# Patient Record
Sex: FEMALE | Race: BLACK OR AFRICAN AMERICAN | NOT HISPANIC OR LATINO
[De-identification: names, ages, dates, MRNs, and addresses within clinical notes are randomized per-mention and may not be internally consistent; named-entity substitution may affect disease eponyms.]

---

## 2022-05-31 ENCOUNTER — APPOINTMENT (OUTPATIENT)
Dept: ORTHOPEDIC SURGERY | Facility: CLINIC | Age: 72
End: 2022-05-31
Payer: MEDICARE

## 2022-05-31 VITALS — HEIGHT: 69 IN | WEIGHT: 190 LBS | BODY MASS INDEX: 28.14 KG/M2

## 2022-05-31 DIAGNOSIS — I10 ESSENTIAL (PRIMARY) HYPERTENSION: ICD-10-CM

## 2022-05-31 PROBLEM — Z00.00 ENCOUNTER FOR PREVENTIVE HEALTH EXAMINATION: Status: ACTIVE | Noted: 2022-05-31

## 2022-05-31 PROCEDURE — 20610 DRAIN/INJ JOINT/BURSA W/O US: CPT | Mod: 50

## 2022-05-31 PROCEDURE — 99214 OFFICE O/P EST MOD 30 MIN: CPT | Mod: 25

## 2022-05-31 PROCEDURE — J3490M: CUSTOM

## 2022-05-31 NOTE — IMAGING
[de-identified] : Right Knee: Inspection of the knee is as follows: no effusion, erythema, ecchymosis, scars or deformities. Palpation\par of the knee is as follows: medial facet of patella tenderness, lateral facet of patella tenderness, patellar\par compression tenderness and crepitus about the patella. Knee Range of Motion is as follows: Flexion:\par 130 degrees. Range of motion 0 degrees in extension. Strength examination of the knee is as follows: Quadriceps\par strength is 5/5 Hamstring strength is 5/5 Neurological examination of the knee is as follows: light touch is intact\par throughout.\par \par Left Knee: Inspection of the knee is as follows: no effusion, erythema, ecchymosis, scars or deformities. Palpation of\par the knee is as follows: medial facet of patella tenderness, lateral facet of patella tenderness, patellar\par compression tenderness and crepitus about the patella. Knee Range of Motion is as follows: Flexion:\par 130 degrees. Range of motion 0 degrees in extension. Strength examination of the knee is as follows: Quadriceps\par strength is 5/5 Hamstring strength is 5/5 Neurological examination of the knee is as follows: light touch is intact\par throughout\par

## 2022-05-31 NOTE — HISTORY OF PRESENT ILLNESS
[Steroid] : Steroid [Gradual] : gradual [Result of repetitive motion] : result of repetitive motion [4] : 4 [Dull/Aching] : dull/aching [Occasional] : occasional [Sitting] : sitting [Standing] : standing [Stairs] : stairs [] : no [de-identified] : 02.01.22 [de-identified] : bl knees [de-identified] : none

## 2022-05-31 NOTE — PROCEDURE
[de-identified] : Procedure Name: Large Joint Injection / Aspiration: Depomedrol and Marcaine\par Anesthesia: ethyl chloride sprayed topically.. \par Depomedrol: An injection of Depomedrol 80 mg , 1 cc. \par Marcaine: 5 cc. \par Medication was injected in bilateral knees. Patient has tried OTC's including aspirin, Ibuprofen, Aleve etc or prescriptionNSAIDS, and/or exercises at home and/ or physical therapy without satisfactory response. After verbal consent using sterile preparation and technique. The risks, benefits, and alternatives to cortisone injection were explained in full to the patient. Risks outlined include but are not limited to infection, sepsis, bleeding, scarring, skin discoloration, temporary  increase in pain, syncopal episode, failure to resolve symptoms, allergic reaction, symptom recurrence, and elevation of blood sugar in diabetics. Patient understood the risks. All questions were answered. After discussion of options, patient requested an injection. Oral informed consent was obtained and sterile prep was done of the injection site. Sterile technique was utilized for the procedure including the preparation of the solutions used for the injection. Patient tolerated the procedure well. Advised to ice the injection site this evening. Prep with alcohol locally to site. Sterile technique used. Post Procedure Instructions: Patient was advised to call if redness, pain, or fever occur and apply ice for 15 min. out of every hour for the next 12-24 hours as tolerated.

## 2022-07-05 ENCOUNTER — APPOINTMENT (OUTPATIENT)
Dept: ORTHOPEDIC SURGERY | Facility: CLINIC | Age: 72
End: 2022-07-05

## 2022-07-05 VITALS — BODY MASS INDEX: 28.14 KG/M2 | WEIGHT: 190 LBS | HEIGHT: 69 IN

## 2022-07-05 PROCEDURE — 73610 X-RAY EXAM OF ANKLE: CPT | Mod: LT

## 2022-07-05 NOTE — HISTORY OF PRESENT ILLNESS
[0] : 0 [de-identified] : 1/19/18: Ms. Maggi Rodriguez, a 67-year-old female, presents today for L ankle pain after slipping\par on black ice on 1/10/18. Went to [facility], XRs showed displaced trimalleolar fracture, was reduced in ER and put in splint\par Ambulating with crutches. Taking Percocet prn with some relief. Denies history of prior injury\par \par 1/25/18: L trimal ORIF\par \par 2/1/18: f/u L ankle, NWB in splint, comfortable.\par 2/27/18 f/u L ankle nwb in sl[facility]\par 3/17/18: f/u L ankle, wb with brace and 1 crutch. PT/HEP\par 4/17/18 f/u L ankle HEP/PT had spin class/yoga\par 5/29/18: f/u L ankle, spin/yoga. still with swelling.\par 9/18/18 f/u L ankle back to reg activities, yoga, spin\par 12/11/18: f/u L ankle, feels good and is back to regular activities. elevates. occ pain medially.\par 6/11/19: F/u L ankle, HEP\par 12/10/19: f/u L ankle, feels well, doing HEP (spin [employer] yoga, wt training). Here for 6 mo checkup\par 12/3/20: f/u L ankle, doing well. some stiffness. doing very well [employer] yoga\par 7/6/21: f/u L ankle, doing well. Still with some stiffness. Yoga, treadmill\par 12/7/21: f/u L ankle, doing well. Stiffness at times. Active and doing yoga. Feels "locking" at night.\par 7/5/22: f/u L ankle, doing well, no pain [FreeTextEntry1] : left ankle

## 2022-07-05 NOTE — IMAGING
[Weight -] : weightbearing [No loss of surgical correlation. Bony alignment acceptable. Hardware in appropriate position] : No loss of surgical correlation. Bony alignment acceptable. Hardware in appropriate position

## 2022-07-05 NOTE — PHYSICAL EXAM
[Left] : left foot and ankle [NL (40)] : plantar flexion 40 degrees [NL 30)] : inversion 30 degrees [NL (20)] : eversion 20 degrees [5___] : plantar flexion 5[unfilled]/5 [2+] : dorsalis pedis pulse: 2+ [] : no toe tenderness

## 2022-09-27 ENCOUNTER — APPOINTMENT (OUTPATIENT)
Dept: ORTHOPEDIC SURGERY | Facility: CLINIC | Age: 72
End: 2022-09-27

## 2022-11-25 ENCOUNTER — RX RENEWAL (OUTPATIENT)
Age: 72
End: 2022-11-25

## 2022-12-09 ENCOUNTER — APPOINTMENT (OUTPATIENT)
Dept: ORTHOPEDIC SURGERY | Facility: CLINIC | Age: 72
End: 2022-12-09

## 2022-12-23 ENCOUNTER — APPOINTMENT (OUTPATIENT)
Dept: ORTHOPEDIC SURGERY | Facility: CLINIC | Age: 72
End: 2022-12-23

## 2022-12-23 VITALS — WEIGHT: 180 LBS | BODY MASS INDEX: 26.66 KG/M2 | HEIGHT: 69 IN

## 2022-12-23 PROCEDURE — J3490N: CUSTOM

## 2022-12-23 PROCEDURE — 20610 DRAIN/INJ JOINT/BURSA W/O US: CPT | Mod: 50

## 2022-12-23 PROCEDURE — 99214 OFFICE O/P EST MOD 30 MIN: CPT | Mod: 25

## 2022-12-23 NOTE — HISTORY OF PRESENT ILLNESS
[Left Leg] : left leg [Right Leg] : right leg [Gradual] : gradual [Result of repetitive motion] : result of repetitive motion [5] : 5 [4] : 4 [Dull/Aching] : dull/aching [Localized] : localized [Intermittent] : intermittent [Household chores] : household chores [Sitting] : sitting [Standing] : standing [Stairs] : stairs [Steroid] : Steroid [] : no [FreeTextEntry1] : KNEES  [FreeTextEntry5] : NO INJURY  [FreeTextEntry9] : CORTISONE INJECTIONS [de-identified] : CORTISONE INJECTIONS 05/31/22 [de-identified] : 02.01.22 [de-identified] : bl knees [de-identified] : none

## 2022-12-23 NOTE — ASSESSMENT
[FreeTextEntry1] : 72 year F WITH MODERATE B/L KNEE OA. PAIN WORSENS WITH STAIRS AND WALKING PROLONGED DISTANCES. PAIN IS AFFECTING ADL AND FUNCTIONAL ACTIVITIES. TREATMENT OPTIONS REVIEWED. \par \par B/L KNEE CSI TODAY. PATIENT TOLERATED INJECTION WELL.

## 2022-12-23 NOTE — IMAGING
[de-identified] : Right Knee: Inspection of the knee is as follows: no effusion, erythema, ecchymosis, scars or deformities. Palpation\par of the knee is as follows: medial facet of patella tenderness, lateral facet of patella tenderness, patellar\par compression tenderness and crepitus about the patella. Knee Range of Motion is as follows: Flexion:\par 130 degrees. Range of motion 0 degrees in extension. Strength examination of the knee is as follows: Quadriceps\par strength is 5/5 Hamstring strength is 5/5 Neurological examination of the knee is as follows: light touch is intact\par throughout.\par \par Left Knee: Inspection of the knee is as follows: no effusion, erythema, ecchymosis, scars or deformities. Palpation of\par the knee is as follows: medial facet of patella tenderness, lateral facet of patella tenderness, patellar\par compression tenderness and crepitus about the patella. Knee Range of Motion is as follows: Flexion:\par 130 degrees. Range of motion 0 degrees in extension. Strength examination of the knee is as follows: Quadriceps\par strength is 5/5 Hamstring strength is 5/5 Neurological examination of the knee is as follows: light touch is intact\par throughout\par

## 2022-12-23 NOTE — PROCEDURE
[de-identified] : Procedure Name: Large Joint Injection / Aspiration: Depomedrol and Marcaine Anesthesia: ethyl chloride sprayed topically..  Depomedrol: An injection of Depomedrol 80 mg , 1 cc.  Marcaine: 5 cc.  Medication was injected in bilateral knees. Patient has tried OTC's including aspirin, Ibuprofen, Aleve etc or prescriptionNSAIDS, and/or exercises at home and/ or physical therapy without satisfactory response. After verbal consent using sterile preparation and technique. The risks, benefits, and alternatives to cortisone injection were explained in full to the patient. Risks outlined include but are not limited to infection, sepsis, bleeding, scarring, skin discoloration, temporary  increase in pain, syncopal episode, failure to resolve symptoms, allergic reaction, symptom recurrence, and elevation of blood sugar in diabetics. Patient understood the risks. All questions were answered. After discussion of options, patient requested an injection. Oral informed consent was obtained and sterile prep was done of the injection site. Sterile technique was utilized for the procedure including the preparation of the solutions used for the injection. Patient tolerated the procedure well. Advised to ice the injection site this evening. Prep with alcohol locally to site. Sterile technique used. Post Procedure Instructions: Patient was advised to call if redness, pain, or fever occur and apply ice for 15 min. out of every hour for the next 12-24 hours as tolerated.

## 2023-02-24 ENCOUNTER — APPOINTMENT (OUTPATIENT)
Dept: ORTHOPEDIC SURGERY | Facility: CLINIC | Age: 73
End: 2023-02-24

## 2023-03-07 ENCOUNTER — APPOINTMENT (OUTPATIENT)
Dept: ORTHOPEDIC SURGERY | Facility: CLINIC | Age: 73
End: 2023-03-07

## 2023-03-24 ENCOUNTER — APPOINTMENT (OUTPATIENT)
Dept: ORTHOPEDIC SURGERY | Facility: CLINIC | Age: 73
End: 2023-03-24
Payer: MEDICARE

## 2023-03-24 VITALS — HEIGHT: 69 IN | WEIGHT: 190 LBS | BODY MASS INDEX: 28.14 KG/M2

## 2023-03-24 PROCEDURE — 99214 OFFICE O/P EST MOD 30 MIN: CPT | Mod: 25

## 2023-03-24 PROCEDURE — 20610 DRAIN/INJ JOINT/BURSA W/O US: CPT | Mod: 50

## 2023-03-24 PROCEDURE — J3490M: CUSTOM | Mod: NC

## 2023-03-24 NOTE — ASSESSMENT
[FreeTextEntry1] : 73 year F WITH MODERATE B/L KNEE OA. PAIN WORSENS WITH STAIRS AND WALKING PROLONGED DISTANCES. PAIN IS AFFECTING ADL AND FUNCTIONAL ACTIVITIES. TREATMENT OPTIONS REVIEWED. QUESTIONS ANSWERED. \par \par B/L KNEE CSI TODAY. PATIENT TOLERATED INJECTION WELL.

## 2023-03-24 NOTE — PROCEDURE
[de-identified] : Procedure Name: Large Joint Injection / Aspiration: Depomedrol and Marcaine\par Anesthesia: ethyl chloride sprayed topically.. \par Depomedrol: An injection of Depomedrol 80 mg , 1 cc. \par Marcaine: 5 cc. \par Medication was injected in bilateral knees. Patient has tried OTC's including aspirin, Ibuprofen, Aleve etc or prescriptionNSAIDS, and/or exercises at home and/ or physical therapy without satisfactory response. After verbal consent using sterile preparation and technique. The risks, benefits, and alternatives to cortisone injection were explained in full to the patient. Risks outlined include but are not limited to infection, sepsis, bleeding, scarring, skin discoloration, temporary  increase in pain, syncopal episode, failure to resolve symptoms, allergic reaction, symptom recurrence, and elevation of blood sugar in diabetics. Patient understood the risks. All questions were answered. After discussion of options, patient requested an injection. Oral informed consent was obtained and sterile prep was done of the injection site. Sterile technique was utilized for the procedure including the preparation of the solutions used for the injection. Patient tolerated the procedure well. Advised to ice the injection site this evening. Prep with alcohol locally to site. Sterile technique used. Post Procedure Instructions: Patient was advised to call if redness, pain, or fever occur and apply ice for 15 min. out of every hour for the next 12-24 hours as tolerated.

## 2023-03-24 NOTE — IMAGING
[de-identified] : Right Knee: Inspection of the knee is as follows: no effusion, erythema, ecchymosis, scars or deformities. Palpation of the knee is as follows: medial facet of patella tenderness, lateral facet of patella tenderness, patellar compression tenderness and crepitus about the patella. Knee Range of Motion is as follows: Flexion: 130 degrees. Range of motion 0 degrees in extension. Strength examination of the knee is as follows: Quadriceps strength is 5/5 Hamstring strength is 5/5 Neurological examination of the knee is as follows: light touch is intact throughout.\par \par Left Knee: Inspection of the knee is as follows: no effusion, erythema, ecchymosis, scars or deformities. Palpation of the knee is as follows: medial facet of patella tenderness, lateral facet of patella tenderness, patellar compression tenderness and crepitus about the patella. Knee Range of Motion is as follows: Flexion: 130 degrees. Range of motion 0 degrees in extension. Strength examination of the knee is as follows: Quadriceps strength is 5/5 Hamstring strength is 5/5 Neurological examination of the knee is as follows: light touch is intact throughout.

## 2023-06-23 ENCOUNTER — APPOINTMENT (OUTPATIENT)
Dept: ORTHOPEDIC SURGERY | Facility: CLINIC | Age: 73
End: 2023-06-23
Payer: MEDICARE

## 2023-06-23 VITALS — BODY MASS INDEX: 26.66 KG/M2 | HEIGHT: 69 IN | WEIGHT: 180 LBS

## 2023-06-23 PROCEDURE — J3490M: CUSTOM | Mod: NC

## 2023-06-23 PROCEDURE — 20610 DRAIN/INJ JOINT/BURSA W/O US: CPT | Mod: LT

## 2023-06-23 PROCEDURE — 99213 OFFICE O/P EST LOW 20 MIN: CPT | Mod: 25

## 2023-06-23 NOTE — PROCEDURE
[de-identified] : Procedure Name: Large Joint Injection / Aspiration: Depomedrol and Marcaine\par Anesthesia: ethyl chloride sprayed topically.. \par Depomedrol: An injection of Depomedrol 80 mg , 1 cc. \par Marcaine: 5 cc. \par Medication was injected in bilateral knees. Patient has tried OTC's including aspirin, Ibuprofen, Aleve etc or prescriptionNSAIDS, and/or exercises at home and/ or physical therapy without satisfactory response. After verbal consent using sterile preparation and technique. The risks, benefits, and alternatives to cortisone injection were explained in full to the patient. Risks outlined include but are not limited to infection, sepsis, bleeding, scarring, skin discoloration, temporary  increase in pain, syncopal episode, failure to resolve symptoms, allergic reaction, symptom recurrence, and elevation of blood sugar in diabetics. Patient understood the risks. All questions were answered. After discussion of options, patient requested an injection. Oral informed consent was obtained and sterile prep was done of the injection site. Sterile technique was utilized for the procedure including the preparation of the solutions used for the injection. Patient tolerated the procedure well. Advised to ice the injection site this evening. Prep with alcohol locally to site. Sterile technique used. Post Procedure Instructions: Patient was advised to call if redness, pain, or fever occur and apply ice for 15 min. out of every hour for the next 12-24 hours as tolerated.

## 2023-06-23 NOTE — ASSESSMENT
[FreeTextEntry1] : 73 year F WITH MODERATE B/L KNEE OA. PAIN WORSENS WITH STAIRS AND WALKING PROLONGED DISTANCES. PAIN IS AFFECTING ADL AND FUNCTIONAL ACTIVITIES. TREATMENT OPTIONS REVIEWED. QUESTIONS ANSWERED. \par \par B/L KNEE CSI TODAY. PATIENT TOLERATED INJECTION WELL. \par \par FU IN 3 MONTHS WITH REPEAT XRAYS

## 2023-06-23 NOTE — IMAGING
[de-identified] : Right Knee: Inspection of the knee is as follows: no effusion, erythema, ecchymosis, scars or deformities. Palpation of the knee is as follows: medial facet of patella tenderness, lateral facet of patella tenderness, patellar compression tenderness and crepitus about the patella. Knee Range of Motion is as follows: Flexion: 130 degrees. Range of motion 0 degrees in extension. Strength examination of the knee is as follows: Quadriceps strength is 5/5 Hamstring strength is 5/5 Neurological examination of the knee is as follows: light touch is intact throughout.\par \par Left Knee: Inspection of the knee is as follows: no effusion, erythema, ecchymosis, scars or deformities. Palpation of the knee is as follows: medial facet of patella tenderness, lateral facet of patella tenderness, patellar compression tenderness and crepitus about the patella. Knee Range of Motion is as follows: Flexion: 130 degrees. Range of motion 0 degrees in extension. Strength examination of the knee is as follows: Quadriceps strength is 5/5 Hamstring strength is 5/5 Neurological examination of the knee is as follows: light touch is intact throughout.

## 2023-09-12 ENCOUNTER — APPOINTMENT (OUTPATIENT)
Dept: ORTHOPEDIC SURGERY | Facility: CLINIC | Age: 73
End: 2023-09-12
Payer: MEDICARE

## 2023-09-12 PROCEDURE — 20610 DRAIN/INJ JOINT/BURSA W/O US: CPT | Mod: LT

## 2023-09-12 PROCEDURE — 99213 OFFICE O/P EST LOW 20 MIN: CPT | Mod: 25

## 2023-09-12 PROCEDURE — J3490M: CUSTOM | Mod: NC

## 2023-09-12 PROCEDURE — 73564 X-RAY EXAM KNEE 4 OR MORE: CPT | Mod: 50

## 2023-09-19 ENCOUNTER — APPOINTMENT (OUTPATIENT)
Dept: ORTHOPEDIC SURGERY | Facility: CLINIC | Age: 73
End: 2023-09-19
Payer: MEDICARE

## 2023-09-19 DIAGNOSIS — Z98.890 OTHER SPECIFIED POSTPROCEDURAL STATES: ICD-10-CM

## 2023-09-19 PROCEDURE — 99213 OFFICE O/P EST LOW 20 MIN: CPT

## 2023-09-22 ENCOUNTER — APPOINTMENT (OUTPATIENT)
Dept: ORTHOPEDIC SURGERY | Facility: CLINIC | Age: 73
End: 2023-09-22

## 2023-12-12 ENCOUNTER — APPOINTMENT (OUTPATIENT)
Dept: ORTHOPEDIC SURGERY | Facility: CLINIC | Age: 73
End: 2023-12-12
Payer: MEDICARE

## 2023-12-12 VITALS — HEIGHT: 69 IN | BODY MASS INDEX: 28.14 KG/M2 | WEIGHT: 190 LBS

## 2023-12-12 PROCEDURE — J3490M: CUSTOM | Mod: NC

## 2023-12-12 PROCEDURE — 20610 DRAIN/INJ JOINT/BURSA W/O US: CPT | Mod: 50

## 2023-12-12 PROCEDURE — 99213 OFFICE O/P EST LOW 20 MIN: CPT | Mod: 25

## 2023-12-13 ENCOUNTER — RX RENEWAL (OUTPATIENT)
Age: 73
End: 2023-12-13

## 2024-03-05 ENCOUNTER — APPOINTMENT (OUTPATIENT)
Dept: ORTHOPEDIC SURGERY | Facility: CLINIC | Age: 74
End: 2024-03-05
Payer: MEDICARE

## 2024-03-05 VITALS — BODY MASS INDEX: 28.14 KG/M2 | HEIGHT: 69 IN | WEIGHT: 190 LBS

## 2024-03-05 PROCEDURE — 99213 OFFICE O/P EST LOW 20 MIN: CPT | Mod: 25

## 2024-03-05 PROCEDURE — 20610 DRAIN/INJ JOINT/BURSA W/O US: CPT | Mod: LT

## 2024-03-05 PROCEDURE — J3490M: CUSTOM | Mod: NC

## 2024-03-05 RX ORDER — DICLOFENAC SODIUM 1% 10 MG/G
1 GEL TOPICAL
Qty: 100 | Refills: 2 | Status: ACTIVE | COMMUNITY
Start: 2022-10-25 | End: 1900-01-01

## 2024-03-05 NOTE — IMAGING
[de-identified] : Right Knee: Inspection of the knee is as follows: no effusion, erythema, ecchymosis, scars or deformities. Palpation of the knee is as follows: medial facet of patella tenderness, lateral facet of patella tenderness, patellar compression tenderness and crepitus about the patella. Knee Range of Motion is as follows: Flexion: 130 degrees. Range of motion 0 degrees in extension. Strength examination of the knee is as follows: Quadriceps strength is 5/5 Hamstring strength is 5/5 Neurological examination of the knee is as follows: light touch is intact throughout.\par  \par  Left Knee: Inspection of the knee is as follows: no effusion, erythema, ecchymosis, scars or deformities. Palpation of the knee is as follows: medial facet of patella tenderness, lateral facet of patella tenderness, patellar compression tenderness and crepitus about the patella. Knee Range of Motion is as follows: Flexion: 130 degrees. Range of motion 0 degrees in extension. Strength examination of the knee is as follows: Quadriceps strength is 5/5 Hamstring strength is 5/5 Neurological examination of the knee is as follows: light touch is intact throughout.

## 2024-03-05 NOTE — ASSESSMENT
[FreeTextEntry1] : 74 year F WITH MODERATE B/L KNEE OA. PAIN WORSENS WITH STAIRS AND WALKING PROLONGED DISTANCES. PAIN IS AFFECTING ADL AND FUNCTIONAL ACTIVITIES. TREATMENT OPTIONS REVIEWED. QUESTIONS ANSWERED.  B/L KNEE CSI TODAY. PATIENT TOLERATED INJECTION WELL.

## 2024-03-05 NOTE — PROCEDURE
[de-identified] : Procedure Name: Large Joint Injection / Aspiration: Depomedrol and Marcaine Anesthesia: ethyl chloride sprayed topically..  Depomedrol: An injection of Depomedrol 80 mg , 1 cc.  Marcaine: 5 cc.  Medication was injected in bilateral knees. Patient has tried OTC's including aspirin, Ibuprofen, Aleve etc or prescriptionNSAIDS, and/or exercises at home and/ or physical therapy without satisfactory response. After verbal consent using sterile preparation and technique. The risks, benefits, and alternatives to cortisone injection were explained in full to the patient. Risks outlined include but are not limited to infection, sepsis, bleeding, scarring, skin discoloration, temporary  increase in pain, syncopal episode, failure to resolve symptoms, allergic reaction, symptom recurrence, and elevation of blood sugar in diabetics. Patient understood the risks. All questions were answered. After discussion of options, patient requested an injection. Oral informed consent was obtained and sterile prep was done of the injection site. Sterile technique was utilized for the procedure including the preparation of the solutions used for the injection. Patient tolerated the procedure well. Advised to ice the injection site this evening. Prep with alcohol locally to site. Sterile technique used. Post Procedure Instructions: Patient was advised to call if redness, pain, or fever occur and apply ice for 15 min. out of every hour for the next 12-24 hours as tolerated.

## 2024-03-05 NOTE — HISTORY OF PRESENT ILLNESS
[5] : 5 [Dull/Aching] : dull/aching [Intermittent] : intermittent [Injection therapy] : injection therapy [Stairs] : stairs [Walking] : walking [de-identified] : 09/12/23 follow up bl knees had csi last visit with good relief   12/12/23: Here for b/l knee follow up. CSI at last visit gave good relief. Wants repeat CSI today.   03/05/24 FOLLOW UP BL KNEES HAD CSI LAST VISIT WITH SOME RELIEF  [de-identified] : csi

## 2024-06-04 ENCOUNTER — APPOINTMENT (OUTPATIENT)
Dept: ORTHOPEDIC SURGERY | Facility: CLINIC | Age: 74
End: 2024-06-04
Payer: MEDICARE

## 2024-06-04 VITALS — WEIGHT: 190 LBS | BODY MASS INDEX: 28.14 KG/M2 | HEIGHT: 69 IN

## 2024-06-04 DIAGNOSIS — M17.11 UNILATERAL PRIMARY OSTEOARTHRITIS, RIGHT KNEE: ICD-10-CM

## 2024-06-04 DIAGNOSIS — M17.12 UNILATERAL PRIMARY OSTEOARTHRITIS, LEFT KNEE: ICD-10-CM

## 2024-06-04 PROCEDURE — J3490M: CUSTOM | Mod: NC

## 2024-06-04 PROCEDURE — 20610 DRAIN/INJ JOINT/BURSA W/O US: CPT | Mod: 50

## 2024-06-04 NOTE — IMAGING
[de-identified] : Right Knee: Inspection of the knee is as follows: no effusion, erythema, ecchymosis, scars or deformities. Palpation of the knee is as follows: medial facet of patella tenderness, lateral facet of patella tenderness, patellar compression tenderness and crepitus about the patella. Knee Range of Motion is as follows: Flexion: 130 degrees. Range of motion 0 degrees in extension. Strength examination of the knee is as follows: Quadriceps strength is 5/5 Hamstring strength is 5/5 Neurological examination of the knee is as follows: light touch is intact throughout.\par  \par  Left Knee: Inspection of the knee is as follows: no effusion, erythema, ecchymosis, scars or deformities. Palpation of the knee is as follows: medial facet of patella tenderness, lateral facet of patella tenderness, patellar compression tenderness and crepitus about the patella. Knee Range of Motion is as follows: Flexion: 130 degrees. Range of motion 0 degrees in extension. Strength examination of the knee is as follows: Quadriceps strength is 5/5 Hamstring strength is 5/5 Neurological examination of the knee is as follows: light touch is intact throughout.

## 2024-06-04 NOTE — PROCEDURE
[de-identified] : Procedure Name: Large Joint Injection / Aspiration: Depomedrol and Marcaine Anesthesia: ethyl chloride sprayed topically..  Depomedrol: An injection of Depomedrol 80 mg , 1 cc.  Marcaine: 5 cc.  Medication was injected in bilateral knees. Patient has tried OTC's including aspirin, Ibuprofen, Aleve etc or prescriptionNSAIDS, and/or exercises at home and/ or physical therapy without satisfactory response. After verbal consent using sterile preparation and technique. The risks, benefits, and alternatives to cortisone injection were explained in full to the patient. Risks outlined include but are not limited to infection, sepsis, bleeding, scarring, skin discoloration, temporary  increase in pain, syncopal episode, failure to resolve symptoms, allergic reaction, symptom recurrence, and elevation of blood sugar in diabetics. Patient understood the risks. All questions were answered. After discussion of options, patient requested an injection. Oral informed consent was obtained and sterile prep was done of the injection site. Sterile technique was utilized for the procedure including the preparation of the solutions used for the injection. Patient tolerated the procedure well. Advised to ice the injection site this evening. Prep with alcohol locally to site. Sterile technique used. Post Procedure Instructions: Patient was advised to call if redness, pain, or fever occur and apply ice for 15 min. out of every hour for the next 12-24 hours as tolerated.

## 2024-06-04 NOTE — HISTORY OF PRESENT ILLNESS
[5] : 5 [Dull/Aching] : dull/aching [Intermittent] : intermittent [Injection therapy] : injection therapy [Walking] : walking [Stairs] : stairs [de-identified] : 09/12/23 follow up bl knees had csi last visit with good relief   12/12/23: Here for b/l knee follow up. CSI at last visit gave good relief. Wants repeat CSI today.   03/05/24 FOLLOW UP BL KNEES HAD CSI LAST VISIT WITH SOME RELIEF   6/4/24: B/L KNEE FOLLOW UP. CSI FROM 3/5/24 GAVE SOME RELIEF. HERE FOR REPEAT CSI.  [de-identified] : csi

## 2024-09-10 ENCOUNTER — APPOINTMENT (OUTPATIENT)
Dept: ORTHOPEDIC SURGERY | Facility: CLINIC | Age: 74
End: 2024-09-10

## 2024-09-10 ENCOUNTER — APPOINTMENT (OUTPATIENT)
Dept: ORTHOPEDIC SURGERY | Facility: CLINIC | Age: 74
End: 2024-09-10
Payer: MEDICARE

## 2024-09-10 DIAGNOSIS — M17.11 UNILATERAL PRIMARY OSTEOARTHRITIS, RIGHT KNEE: ICD-10-CM

## 2024-09-10 DIAGNOSIS — M17.12 UNILATERAL PRIMARY OSTEOARTHRITIS, LEFT KNEE: ICD-10-CM

## 2024-09-10 PROCEDURE — 99214 OFFICE O/P EST MOD 30 MIN: CPT

## 2024-09-10 PROCEDURE — 20610 DRAIN/INJ JOINT/BURSA W/O US: CPT | Mod: 50

## 2024-09-10 PROCEDURE — J3490M: CUSTOM | Mod: NC,JZ

## 2024-09-10 NOTE — PROCEDURE
[de-identified] : Procedure Name: Large Joint Injection / Aspiration: Depomedrol and Marcaine Anesthesia: ethyl chloride sprayed topically..  Depomedrol: An injection of Depomedrol 80 mg , 1 cc.  Marcaine: 5 cc.  Medication was injected in bilateral knees. Patient has tried OTC's including aspirin, Ibuprofen, Aleve etc or prescriptionNSAIDS, and/or exercises at home and/ or physical therapy without satisfactory response. After verbal consent using sterile preparation and technique. The risks, benefits, and alternatives to cortisone injection were explained in full to the patient. Risks outlined include but are not limited to infection, sepsis, bleeding, scarring, skin discoloration, temporary  increase in pain, syncopal episode, failure to resolve symptoms, allergic reaction, symptom recurrence, and elevation of blood sugar in diabetics. Patient understood the risks. All questions were answered. After discussion of options, patient requested an injection. Oral informed consent was obtained and sterile prep was done of the injection site. Sterile technique was utilized for the procedure including the preparation of the solutions used for the injection. Patient tolerated the procedure well. Advised to ice the injection site this evening. Prep with alcohol locally to site. Sterile technique used. Post Procedure Instructions: Patient was advised to call if redness, pain, or fever occur and apply ice for 15 min. out of every hour for the next 12-24 hours as tolerated.

## 2024-09-10 NOTE — IMAGING
[de-identified] : Right Knee: Inspection of the knee is as follows: no effusion, erythema, ecchymosis, scars or deformities. Palpation of the knee is as follows: medial facet of patella tenderness, lateral facet of patella tenderness, patellar compression tenderness and crepitus about the patella. Knee Range of Motion is as follows: Flexion: 130 degrees. Range of motion 0 degrees in extension. Strength examination of the knee is as follows: Quadriceps strength is 5/5 Hamstring strength is 5/5 Neurological examination of the knee is as follows: light touch is intact throughout.\par  \par  Left Knee: Inspection of the knee is as follows: no effusion, erythema, ecchymosis, scars or deformities. Palpation of the knee is as follows: medial facet of patella tenderness, lateral facet of patella tenderness, patellar compression tenderness and crepitus about the patella. Knee Range of Motion is as follows: Flexion: 130 degrees. Range of motion 0 degrees in extension. Strength examination of the knee is as follows: Quadriceps strength is 5/5 Hamstring strength is 5/5 Neurological examination of the knee is as follows: light touch is intact throughout.

## 2024-09-10 NOTE — HISTORY OF PRESENT ILLNESS
[5] : 5 [Dull/Aching] : dull/aching [Intermittent] : intermittent [Injection therapy] : injection therapy [Walking] : walking [Stairs] : stairs [de-identified] : 09/12/23 follow up bl knees had csi last visit with good relief   12/12/23: Here for b/l knee follow up. CSI at last visit gave good relief. Wants repeat CSI today.   03/05/24 FOLLOW UP BL KNEES HAD CSI LAST VISIT WITH SOME RELIEF   6/4/24: B/L KNEE FOLLOW UP. CSI FROM 3/5/24 GAVE SOME RELIEF. HERE FOR REPEAT CSI.   09/10/2024: Patient here for follow up on marisabel knee pain. requesting cortisone injection   [de-identified] : csi

## 2024-10-01 ENCOUNTER — APPOINTMENT (OUTPATIENT)
Dept: ORTHOPEDIC SURGERY | Facility: CLINIC | Age: 74
End: 2024-10-01
Payer: MEDICARE

## 2024-10-01 DIAGNOSIS — Z98.890 OTHER SPECIFIED POSTPROCEDURAL STATES: ICD-10-CM

## 2024-10-01 PROCEDURE — 73610 X-RAY EXAM OF ANKLE: CPT | Mod: LT

## 2024-10-01 PROCEDURE — 99213 OFFICE O/P EST LOW 20 MIN: CPT

## 2024-10-01 NOTE — HISTORY OF PRESENT ILLNESS
[1] : 2 [0] : 0 [de-identified] : 1/19/18: Ms. Maggi Rodriguez, a 67-year-old female, presents today for L ankle pain after slipping on black ice on 1/10/18. Went to [facility], XRs showed displaced trimalleolar fracture, was reduced in ER and put in splint Ambulating with crutches. Taking Percocet prn with some relief. Denies history of prior injury  1/25/18: L trimal ORIF  2/1/18: f/u L ankle, NWB in splint, comfortable. 2/27/18 f/u L ankle nwb in [facility] 3/17/18: f/u L ankle, wb with brace and 1 crutch. PT/HEP 4/17/18 f/u L ankle HEP/PT had spin class/yoga 5/29/18: f/u L ankle, spin/yoga. still with swelling. 9/18/18 f/u L ankle back to reg activities, yoga, spin 12/11/18: f/u L ankle, feels good and is back to regular activities. elevates. occ pain medially. 6/11/19: F/u L ankle, HEP 12/10/19: f/u L ankle, feels well, doing HEP (spin [employer] yoga, wt training). Here for 6 mo checkup 12/3/20: f/u L ankle, doing well. some stiffness. doing very well [employer] yoga 7/6/21: f/u L ankle, doing well. Still with some stiffness. Yoga, treadmill 12/7/21: f/u L ankle, doing well. Stiffness at times. Active and doing yoga. Feels "locking" at night. 7/5/22: f/u L ankle, doing well, no pain 9/19/23: f/u L ankle; doing well, no pain. no limits in her activity. Some stiffness, no locking.  10/01/2024: follow up on left ankle. States pain has been okay. WB in sneakers.  [FreeTextEntry1] : Left Ankle [FreeTextEntry6] : stiffness

## 2024-12-10 ENCOUNTER — APPOINTMENT (OUTPATIENT)
Dept: ORTHOPEDIC SURGERY | Facility: CLINIC | Age: 74
End: 2024-12-10
Payer: MEDICARE

## 2024-12-10 VITALS — WEIGHT: 190 LBS | BODY MASS INDEX: 28.14 KG/M2 | HEIGHT: 69 IN

## 2024-12-10 DIAGNOSIS — M17.12 UNILATERAL PRIMARY OSTEOARTHRITIS, LEFT KNEE: ICD-10-CM

## 2024-12-10 DIAGNOSIS — M17.11 UNILATERAL PRIMARY OSTEOARTHRITIS, RIGHT KNEE: ICD-10-CM

## 2024-12-10 PROCEDURE — 99214 OFFICE O/P EST MOD 30 MIN: CPT | Mod: 25

## 2024-12-10 PROCEDURE — 20610 DRAIN/INJ JOINT/BURSA W/O US: CPT | Mod: 50

## 2024-12-10 PROCEDURE — J3490M: CUSTOM | Mod: NC

## 2025-02-25 ENCOUNTER — APPOINTMENT (OUTPATIENT)
Dept: ORTHOPEDIC SURGERY | Facility: CLINIC | Age: 75
End: 2025-02-25
Payer: MEDICARE

## 2025-02-25 DIAGNOSIS — M17.12 UNILATERAL PRIMARY OSTEOARTHRITIS, LEFT KNEE: ICD-10-CM

## 2025-02-25 DIAGNOSIS — M17.11 UNILATERAL PRIMARY OSTEOARTHRITIS, RIGHT KNEE: ICD-10-CM

## 2025-02-25 PROCEDURE — 73564 X-RAY EXAM KNEE 4 OR MORE: CPT | Mod: 50

## 2025-02-25 PROCEDURE — 99214 OFFICE O/P EST MOD 30 MIN: CPT | Mod: 25

## 2025-02-25 PROCEDURE — 20610 DRAIN/INJ JOINT/BURSA W/O US: CPT | Mod: RT

## 2025-02-25 PROCEDURE — J3490M: CUSTOM | Mod: NC,JZ

## 2025-02-25 RX ORDER — MELOXICAM 15 MG/1
15 TABLET ORAL
Qty: 30 | Refills: 1 | Status: ACTIVE | COMMUNITY
Start: 2025-02-25 | End: 1900-01-01

## 2025-03-11 ENCOUNTER — APPOINTMENT (OUTPATIENT)
Dept: ORTHOPEDIC SURGERY | Facility: CLINIC | Age: 75
End: 2025-03-11

## 2025-03-18 ENCOUNTER — APPOINTMENT (OUTPATIENT)
Dept: ORTHOPEDIC SURGERY | Facility: CLINIC | Age: 75
End: 2025-03-18
Payer: MEDICARE

## 2025-03-18 DIAGNOSIS — M17.12 UNILATERAL PRIMARY OSTEOARTHRITIS, LEFT KNEE: ICD-10-CM

## 2025-03-18 DIAGNOSIS — M17.11 UNILATERAL PRIMARY OSTEOARTHRITIS, RIGHT KNEE: ICD-10-CM

## 2025-03-18 PROCEDURE — J3490M: CUSTOM | Mod: NC,JZ

## 2025-03-18 PROCEDURE — 20610 DRAIN/INJ JOINT/BURSA W/O US: CPT | Mod: LT

## 2025-03-18 PROCEDURE — 99213 OFFICE O/P EST LOW 20 MIN: CPT | Mod: 25

## 2025-06-10 ENCOUNTER — APPOINTMENT (OUTPATIENT)
Dept: ORTHOPEDIC SURGERY | Facility: CLINIC | Age: 75
End: 2025-06-10
Payer: MEDICARE

## 2025-06-10 VITALS — WEIGHT: 190 LBS | HEIGHT: 69 IN | BODY MASS INDEX: 28.14 KG/M2

## 2025-06-10 PROBLEM — M54.50 LUMBAR BACK PAIN: Status: ACTIVE | Noted: 2025-06-10

## 2025-06-10 PROCEDURE — J3490M: CUSTOM | Mod: NC,JZ

## 2025-06-10 PROCEDURE — 99214 OFFICE O/P EST MOD 30 MIN: CPT | Mod: 25

## 2025-06-10 PROCEDURE — 20610 DRAIN/INJ JOINT/BURSA W/O US: CPT | Mod: RT

## 2025-06-17 ENCOUNTER — APPOINTMENT (OUTPATIENT)
Dept: ORTHOPEDIC SURGERY | Facility: CLINIC | Age: 75
End: 2025-06-17

## 2025-09-16 ENCOUNTER — APPOINTMENT (OUTPATIENT)
Dept: ORTHOPEDIC SURGERY | Facility: CLINIC | Age: 75
End: 2025-09-16

## 2025-09-16 DIAGNOSIS — M17.11 UNILATERAL PRIMARY OSTEOARTHRITIS, RIGHT KNEE: ICD-10-CM

## 2025-09-16 DIAGNOSIS — M17.12 UNILATERAL PRIMARY OSTEOARTHRITIS, LEFT KNEE: ICD-10-CM
